# Patient Record
Sex: FEMALE | Race: WHITE | NOT HISPANIC OR LATINO | Employment: OTHER | URBAN - METROPOLITAN AREA
[De-identification: names, ages, dates, MRNs, and addresses within clinical notes are randomized per-mention and may not be internally consistent; named-entity substitution may affect disease eponyms.]

---

## 2023-09-29 PROBLEM — E78.5 DYSLIPIDEMIA: Status: ACTIVE | Noted: 2023-09-29

## 2023-09-29 PROBLEM — S01.01XA LACERATION OF SCALP: Status: ACTIVE | Noted: 2023-09-29

## 2023-09-29 PROBLEM — M85.80 OSTEOPENIA: Status: ACTIVE | Noted: 2023-09-29

## 2023-09-29 PROBLEM — S00.83XA TRAUMATIC ECCHYMOSIS OF FACE: Status: ACTIVE | Noted: 2023-09-29

## 2023-09-29 PROBLEM — R31.9 HEMATURIA: Status: ACTIVE | Noted: 2023-09-29

## 2023-09-29 PROBLEM — E83.52 HYPERCALCEMIA: Status: ACTIVE | Noted: 2023-09-29

## 2023-09-29 PROBLEM — S00.83XA FACIAL CONTUSION, INITIAL ENCOUNTER: Status: ACTIVE | Noted: 2023-09-29

## 2023-09-29 PROBLEM — L70.9 ACNE: Status: ACTIVE | Noted: 2023-09-29

## 2023-09-29 PROBLEM — M25.559 HIP PAIN: Status: ACTIVE | Noted: 2023-09-29

## 2023-09-29 PROBLEM — R30.0 DYSURIA: Status: ACTIVE | Noted: 2023-09-29

## 2023-09-29 PROBLEM — E55.9 VITAMIN D DEFICIENCY: Status: ACTIVE | Noted: 2023-09-29

## 2023-09-29 PROBLEM — M25.569 KNEE PAIN: Status: ACTIVE | Noted: 2023-09-29

## 2023-09-29 PROBLEM — Z91.81 HISTORY OF RECENT FALL: Status: ACTIVE | Noted: 2023-09-29

## 2023-09-29 PROBLEM — R82.90 ABNORMAL URINE FINDINGS: Status: ACTIVE | Noted: 2023-09-29

## 2023-09-29 PROBLEM — L71.9 ROSACEA: Status: ACTIVE | Noted: 2023-09-29

## 2023-09-29 PROBLEM — M70.50 PES ANSERINE BURSITIS: Status: ACTIVE | Noted: 2023-09-29

## 2023-09-29 PROBLEM — R53.83 FATIGUE: Status: ACTIVE | Noted: 2023-09-29

## 2023-09-29 PROBLEM — R63.5 WEIGHT GAIN: Status: ACTIVE | Noted: 2023-09-29

## 2023-10-03 ENCOUNTER — OFFICE VISIT (OUTPATIENT)
Dept: PRIMARY CARE | Facility: CLINIC | Age: 83
End: 2023-10-03
Payer: MEDICARE

## 2023-10-03 ENCOUNTER — APPOINTMENT (OUTPATIENT)
Dept: PRIMARY CARE | Facility: CLINIC | Age: 83
End: 2023-10-03
Payer: MEDICARE

## 2023-10-03 VITALS
HEART RATE: 64 BPM | SYSTOLIC BLOOD PRESSURE: 168 MMHG | HEIGHT: 65 IN | WEIGHT: 151.2 LBS | OXYGEN SATURATION: 98 % | BODY MASS INDEX: 25.19 KG/M2 | DIASTOLIC BLOOD PRESSURE: 102 MMHG

## 2023-10-03 DIAGNOSIS — Z00.00 ENCOUNTER FOR MEDICARE ANNUAL WELLNESS EXAM: ICD-10-CM

## 2023-10-03 DIAGNOSIS — Z12.31 ENCOUNTER FOR SCREENING MAMMOGRAM FOR MALIGNANT NEOPLASM OF BREAST: ICD-10-CM

## 2023-10-03 DIAGNOSIS — R53.83 TIREDNESS: ICD-10-CM

## 2023-10-03 DIAGNOSIS — Z00.00 HEALTH CARE MAINTENANCE: Primary | ICD-10-CM

## 2023-10-03 DIAGNOSIS — Z78.0 POST-MENOPAUSAL: ICD-10-CM

## 2023-10-03 DIAGNOSIS — R03.0 ELEVATED BLOOD PRESSURE READING: ICD-10-CM

## 2023-10-03 PROCEDURE — 1036F TOBACCO NON-USER: CPT | Performed by: INTERNAL MEDICINE

## 2023-10-03 PROCEDURE — 1159F MED LIST DOCD IN RCRD: CPT | Performed by: INTERNAL MEDICINE

## 2023-10-03 PROCEDURE — G0444 DEPRESSION SCREEN ANNUAL: HCPCS | Performed by: INTERNAL MEDICINE

## 2023-10-03 PROCEDURE — 93000 ELECTROCARDIOGRAM COMPLETE: CPT | Performed by: INTERNAL MEDICINE

## 2023-10-03 PROCEDURE — G0442 ANNUAL ALCOHOL SCREEN 15 MIN: HCPCS | Performed by: INTERNAL MEDICINE

## 2023-10-03 PROCEDURE — 99397 PER PM REEVAL EST PAT 65+ YR: CPT | Performed by: INTERNAL MEDICINE

## 2023-10-03 PROCEDURE — 1126F AMNT PAIN NOTED NONE PRSNT: CPT | Performed by: INTERNAL MEDICINE

## 2023-10-03 PROCEDURE — G0439 PPPS, SUBSEQ VISIT: HCPCS | Performed by: INTERNAL MEDICINE

## 2023-10-03 PROCEDURE — 99497 ADVNCD CARE PLAN 30 MIN: CPT | Performed by: INTERNAL MEDICINE

## 2023-10-03 PROCEDURE — 1170F FXNL STATUS ASSESSED: CPT | Performed by: INTERNAL MEDICINE

## 2023-10-03 ASSESSMENT — ACTIVITIES OF DAILY LIVING (ADL)
TAKING_MEDICATION: INDEPENDENT
DRESSING: INDEPENDENT
DOING_HOUSEWORK: INDEPENDENT
MANAGING_FINANCES: INDEPENDENT
GROCERY_SHOPPING: INDEPENDENT
BATHING: INDEPENDENT

## 2023-10-03 ASSESSMENT — PATIENT HEALTH QUESTIONNAIRE - PHQ9
SUM OF ALL RESPONSES TO PHQ9 QUESTIONS 1 AND 2: 0
1. LITTLE INTEREST OR PLEASURE IN DOING THINGS: NOT AT ALL
2. FEELING DOWN, DEPRESSED OR HOPELESS: NOT AT ALL

## 2023-10-03 NOTE — PROGRESS NOTES
Chief Complaint:   Medicare Wellness Exam/Comprehensive Problem Focused Follow Up and Physical Exam    HPI:  83-year-old female patient presented to the office today for her annual visit and Medicare wellness visit.  Patient is doing great she continues to be active she participates in Silver sneaker exercise classes and water exercise activities.  She denies chest pain and shortness of breath 99 exertion she denies abdominal pain nausea or vomiting she was included in the last week and she had 1 episode of diarrhea that resolved completely no urine symptoms no other complaints.  Assessment and Plan:  Problem List Items Addressed This Visit    None  Visit Diagnoses       Encounter for Medicare annual wellness exam            83-year-old patient with the following issues.    1.  Concerns regarding elevated blood pressure reading, patient never had blood pressure problems, we did repeat the blood pressure today the repeat blood pressure was still elevated, she is going to check it daily and come back within 7 to 10 days with the blood pressure measurements and we will start treatment if indicated and she is going to cut back her salt intake.    2.  Healthcare maintenance issues, mammogram requested and bone mineral density.  Been exempt from Pap smear and last colonoscopy last year.  Vaccines are up-to-date.    3.  Medicare wellness visit was completed with illness requirements.    Disposition, I will see the patient back in the office in 1 year for repeat physical and Medicare wellness visit and sooner if needed.    Active Problem List  Patient Active Problem List   Diagnosis    Acne    Dyslipidemia    Dysuria    Fatigue    Abnormal urine findings    Hematuria    Hypercalcemia    Hip pain    Knee pain    Laceration of scalp    Osteopenia    Pes anserine bursitis    Rosacea    Facial contusion, initial encounter    Traumatic ecchymosis of face    Vitamin D deficiency    Weight gain    Body mass index (BMI) of 23.0 to  23.9 in adult    History of recent fall       Comprehensive Medical/Surgical/Social/Family History  Past Medical History:   Diagnosis Date    Other conditions influencing health status     Menopause    Other conditions influencing health status     Urinary Tract Infection    Other conditions influencing health status     Screening for malignant neoplasms, colon    Personal history of other diseases of the musculoskeletal system and connective tissue 10/25/2021    History of osteopenia    Personal history of other endocrine, nutritional and metabolic disease     History of hyperlipidemia    Personal history of urinary (tract) infections 07/01/2013    Personal history of urinary tract infection     Past Surgical History:   Procedure Laterality Date    COLONOSCOPY  09/15/2017    Complete Colonoscopy    CORONARY ARTERY BYPASS GRAFT      KNEE SURGERY  12/17/2012    Knee Surgery Right    OTHER SURGICAL HISTORY  12/17/2012    Facial Dermabrasion    TONSILLECTOMY  12/17/2012    Tonsillectomy With Adenoidectomy     Social History     Social History Narrative    Not on file   No smoking history , alcohol socially ,1 cup of coffee a day ,no children,    FAMILY HISTORY:  2 brothers and one sister, sister with brother with CAD.  Tobacco/Alcohol/Opioid use, as well as Illicit Drug Use was screened for/reviewed and documented in Social Documentation section of the chart and medication list as appropriate    Allergies and Medications  Penicillins  No current outpatient medications  Medications and Supplements  prescribed by me and other practitioners or clinical pharmacist (such as prescriptions, OTC's, herbal therapies and supplements) were reviewed and documented in the medical record.      Activities of Daily Living  In your present state of health, do you have any difficulty performing the following activities?:   Preparing food and eating?: No  Bathing yourself: No  Getting dressed: No  Using the toilet:No  Moving around from  place to place: No  In the past year have you fallen or had a near fall?:No  Able to manage finances independently: Yes  Able to perform grocery shopping: Yes  Able to manage medications independently: Yes  Able to do housework independently: Yes  Patient self-assessment of health status? Good    Patient Care Team:  Ann Griffith MD as PCP - General  Maricruz Gomez DO as PCP - [facility] [employer] PCP       Current exercise habits:  water exercises few times a week and silver sneakers [place] the rec center.   Dietary issues discussed: Yes  Hearing difficulties: No  Safe in current home environment: Yes  Visual Acuity assessed: Yes  Cognitive Impairment No    Depression Screening  Depression screening (15m) completed using the PHQ-2 questions with results documented in the chart/encounter  (Rooming Screening section for documentation, or note for additional information)    Cardiac Risk Assessment  Cardiovascular risk was discussed and, if needed, lifestyle modifications recommended, including nutritional choices, exercise, and elimination of habits contributing to risk.   We agreed on a plan to reduce the current cardiovascular risk based on above discussion as needed.     Aspirin use/disuse was discussed and documented in the Problem List of the medical record (under Cardiac Risk Counseling) after reviewing the updated guidelines below:  Consider low dose Aspirin ( mg) use if the benefit for cardiovascular disease prevention outweighs risk for bleeding complications.   In general, low dose ASA should be considered:  In patients WITHOUT prior MI/stroke/PAD (primary prevention):   a. Age <60: Use if 10-year cardiovascular disease risk >20%, with discussion of risks and benefits with patient  b. Age 60-<70: Use if 10-year cardiovascular disease risk >20% and low bleeding (e.g., gastrointestinal) risk, with discussion of risks and benefits with patient  c. Age >=70: Do not use    In patients WITH prior  "MI/stroke/PAD (secondary prevention):   Generally use unless extremely high bleeding (e.g., gastrointenstinal) risk, with discussion of risks and benefits with patient    Advance Directives Discussion  Advanced Care Planning (including a Living Will, Healthcare POA, as well as specific end of life choices and/or directives), was discussed with the patient and/or surrogate, voluntarily, and details of that discussion documented in the Problem List (under Advanced Directives Discussion) of the medical record.    (~16 min spent discussing above)   Please bring a copy of your completed advanced directives to be placed in the chart.    ROS otherwise negative aside from what was mentioned above in HPI.    Vitals  BP (!) 168/102 (BP Location: Right arm, Patient Position: Sitting)   Pulse 64   Ht 1.638 m (5' 4.5\")   Wt 68.6 kg (151 lb 3.2 oz)   SpO2 98%   BMI 25.55 kg/m²   Body mass index is 25.55 kg/m².  Physical Exam  Gen: Alert, NAD  HEENT:  Unremarkable  Neck:  No OPAL  Respiratory:  Lungs CTAB  Cardiovascular:  Heart RRR  Neuro:  Gross motor and sensory intact  Skin:  No suspicious lesions present  Breast: No masses, or axillary lymphadenopathy      During the course of the visit the patient was educated and counseled about age appropriate screening and preventive services. Completed preventive screenings were documented in the chart and orders were placed for outstanding screenings/procedures as documented in the Assessment and Plan.    Patient Instructions (the written plan) was given to the patient at check out.    "

## 2023-10-03 NOTE — PROGRESS NOTES
"Subjective   Reason for Visit: Marti Guzman is an 83 y.o. female here for a Medicare Wellness visit.   Past Medical, Surgical, and Family History reviewed and updated in chart.  Reviewed all medications by prescribing practitioner or clinical pharmacist (such as prescriptions, OTCs, herbal therapies and supplements) and documented in the medical record.  HPI    Patient presents today for Medicare Annual Wellness.     Patient not currently on any medications.     Has received FLU and RSV vaccines     Patient denies any other concerns at this time.     Patient Care Team:  Ann Griffith MD as PCP - General  Maricruz Gomez DO as PCP - [employer] PCP     Review of Systems    Objective   Vitals:  BP (!) 168/102 (BP Location: Right arm, Patient Position: Sitting)   Pulse 64   Ht 1.638 m (5' 4.5\")   Wt 68.6 kg (151 lb 3.2 oz)   SpO2 98%   BMI 25.55 kg/m²       Physical Exam    Assessment/Plan   Problem List Items Addressed This Visit    None  Visit Diagnoses       Health care maintenance    -  Primary    Relevant Orders    ECG 12 lead (Clinic Performed) (Completed)    CBC    Comprehensive Metabolic Panel    Lipid Panel    TSH with reflex to Free T4 if abnormal    Urinalysis with Reflex Microscopic    Follow Up In [facility] - PCP - Health Maintenance    Encounter for Medicare annual wellness exam        Relevant Orders    ECG 12 lead (Clinic Performed) (Completed)    Urinalysis with Reflex Microscopic    Follow Up In [facility] - PCP - Medicare Annual    Encounter for screening mammogram for malignant neoplasm of breast        Relevant Orders    BI mammo bilateral screening tomosynthesis    Post-menopausal        Relevant Orders    XR DEXA bone density    Tiredness        Relevant Orders    CBC               "

## 2023-10-04 ENCOUNTER — LAB (OUTPATIENT)
Dept: LAB | Facility: LAB | Age: 83
End: 2023-10-04
Payer: MEDICARE

## 2023-10-04 DIAGNOSIS — Z00.00 ENCOUNTER FOR MEDICARE ANNUAL WELLNESS EXAM: ICD-10-CM

## 2023-10-04 DIAGNOSIS — R82.90 ABNORMAL URINALYSIS: Primary | ICD-10-CM

## 2023-10-04 DIAGNOSIS — R53.83 TIREDNESS: ICD-10-CM

## 2023-10-04 DIAGNOSIS — Z00.00 HEALTH CARE MAINTENANCE: ICD-10-CM

## 2023-10-04 LAB
ALBUMIN SERPL BCP-MCNC: 3.7 G/DL (ref 3.4–5)
ALP SERPL-CCNC: 66 U/L (ref 33–136)
ALT SERPL W P-5'-P-CCNC: 14 U/L (ref 7–45)
ANION GAP SERPL CALC-SCNC: 12 MMOL/L (ref 10–20)
APPEARANCE UR: CLEAR
AST SERPL W P-5'-P-CCNC: 21 U/L (ref 9–39)
BACTERIA #/AREA URNS AUTO: ABNORMAL /HPF
BILIRUB SERPL-MCNC: 0.5 MG/DL (ref 0–1.2)
BILIRUB UR STRIP.AUTO-MCNC: NEGATIVE MG/DL
BUN SERPL-MCNC: 12 MG/DL (ref 6–23)
CALCIUM SERPL-MCNC: 9.2 MG/DL (ref 8.6–10.3)
CHLORIDE SERPL-SCNC: 106 MMOL/L (ref 98–107)
CHOLEST SERPL-MCNC: 196 MG/DL (ref 0–199)
CHOLESTEROL/HDL RATIO: 4.5
CO2 SERPL-SCNC: 26 MMOL/L (ref 21–32)
COLOR UR: YELLOW
CREAT SERPL-MCNC: 0.77 MG/DL (ref 0.5–1.05)
ERYTHROCYTE [DISTWIDTH] IN BLOOD BY AUTOMATED COUNT: 12.9 % (ref 11.5–14.5)
GFR SERPL CREATININE-BSD FRML MDRD: 78 ML/MIN/1.73M*2
GLUCOSE SERPL-MCNC: 75 MG/DL (ref 74–99)
GLUCOSE UR STRIP.AUTO-MCNC: NEGATIVE MG/DL
HCT VFR BLD AUTO: 43.1 % (ref 36–46)
HDLC SERPL-MCNC: 43.1 MG/DL
HGB BLD-MCNC: 14 G/DL (ref 12–16)
KETONES UR STRIP.AUTO-MCNC: NEGATIVE MG/DL
LDLC SERPL CALC-MCNC: 132 MG/DL (ref 140–190)
LEUKOCYTE ESTERASE UR QL STRIP.AUTO: ABNORMAL
MCH RBC QN AUTO: 31 PG (ref 26–34)
MCHC RBC AUTO-ENTMCNC: 32.5 G/DL (ref 32–36)
MCV RBC AUTO: 96 FL (ref 80–100)
MUCOUS THREADS #/AREA URNS AUTO: ABNORMAL /LPF
NITRITE UR QL STRIP.AUTO: NEGATIVE
NON HDL CHOLESTEROL: 153 MG/DL (ref 0–149)
NRBC BLD-RTO: 0 /100 WBCS (ref 0–0)
PH UR STRIP.AUTO: 6 [PH]
PLATELET # BLD AUTO: 310 X10*3/UL (ref 150–450)
PMV BLD AUTO: 10 FL (ref 7.5–11.5)
POTASSIUM SERPL-SCNC: 4 MMOL/L (ref 3.5–5.3)
PROT SERPL-MCNC: 6.6 G/DL (ref 6.4–8.2)
PROT UR STRIP.AUTO-MCNC: NEGATIVE MG/DL
RBC # BLD AUTO: 4.51 X10*6/UL (ref 4–5.2)
RBC # UR STRIP.AUTO: ABNORMAL /UL
RBC #/AREA URNS AUTO: ABNORMAL /HPF
SODIUM SERPL-SCNC: 140 MMOL/L (ref 136–145)
SP GR UR STRIP.AUTO: 1.01
SQUAMOUS #/AREA URNS AUTO: ABNORMAL /HPF
T4 FREE SERPL-MCNC: 0.93 NG/DL (ref 0.61–1.12)
TRIGL SERPL-MCNC: 104 MG/DL (ref 0–149)
TSH SERPL-ACNC: 4.29 MIU/L (ref 0.44–3.98)
UROBILINOGEN UR STRIP.AUTO-MCNC: <2 MG/DL
VLDL: 21 MG/DL (ref 0–40)
WBC # BLD AUTO: 7.6 X10*3/UL (ref 4.4–11.3)
WBC #/AREA URNS AUTO: ABNORMAL /HPF

## 2023-10-04 PROCEDURE — 81001 URINALYSIS AUTO W/SCOPE: CPT

## 2023-10-04 PROCEDURE — 36415 COLL VENOUS BLD VENIPUNCTURE: CPT

## 2023-10-12 ENCOUNTER — TELEPHONE (OUTPATIENT)
Dept: PRIMARY CARE | Facility: CLINIC | Age: 83
End: 2023-10-12

## 2023-10-12 ENCOUNTER — OFFICE VISIT (OUTPATIENT)
Dept: PRIMARY CARE | Facility: CLINIC | Age: 83
End: 2023-10-12
Payer: MEDICARE

## 2023-10-12 VITALS
WEIGHT: 154 LBS | OXYGEN SATURATION: 95 % | DIASTOLIC BLOOD PRESSURE: 82 MMHG | BODY MASS INDEX: 25.66 KG/M2 | HEIGHT: 65 IN | HEART RATE: 75 BPM | SYSTOLIC BLOOD PRESSURE: 138 MMHG

## 2023-10-12 DIAGNOSIS — R03.0 ELEVATED BLOOD PRESSURE READING: ICD-10-CM

## 2023-10-12 DIAGNOSIS — N39.0 URINARY TRACT INFECTION WITH HEMATURIA, SITE UNSPECIFIED: Primary | ICD-10-CM

## 2023-10-12 DIAGNOSIS — R31.9 URINARY TRACT INFECTION WITH HEMATURIA, SITE UNSPECIFIED: Primary | ICD-10-CM

## 2023-10-12 PROCEDURE — 1159F MED LIST DOCD IN RCRD: CPT | Performed by: INTERNAL MEDICINE

## 2023-10-12 PROCEDURE — 99214 OFFICE O/P EST MOD 30 MIN: CPT | Performed by: INTERNAL MEDICINE

## 2023-10-12 PROCEDURE — 1126F AMNT PAIN NOTED NONE PRSNT: CPT | Performed by: INTERNAL MEDICINE

## 2023-10-12 PROCEDURE — 1036F TOBACCO NON-USER: CPT | Performed by: INTERNAL MEDICINE

## 2023-10-12 RX ORDER — NITROFURANTOIN 25; 75 MG/1; MG/1
100 CAPSULE ORAL 2 TIMES DAILY
Qty: 10 CAPSULE | Refills: 0 | Status: SHIPPED | OUTPATIENT
Start: 2023-10-12 | End: 2023-10-17

## 2023-10-12 NOTE — PROGRESS NOTES
Subjective   Patient ID: Marti Guzman is a 83 y.o. female who presents for Hypertension.    HPI   Patient presents today following up with HTN.   Patient had elevated blood pressure reading at last visit.  Patient did monitor blood pressure at home.   Some readings: 18/81, 138/81, 140/86, 123/79.    Patient would like to discuss urinalysis results.   Culture was placed and lab never ran.  Patient is not experiencing urinary symptoms at this time.     Review of Systems    Objective   There were no vitals taken for this visit.    Physical Exam    Assessment/Plan   Problem List Items Addressed This Visit    None  Visit Diagnoses         Codes    Elevated blood pressure reading     R03.0

## 2023-10-13 NOTE — PROGRESS NOTES
"Subjective   Patient ID: [id number]     Marti Guzman is a 83 y.o. female who presents for Hypertension.    Current Outpatient Medications:     nitrofurantoin, macrocrystal-monohydrate, (Macrobid) 100 mg capsule, Take 1 capsule (100 mg) by mouth 2 times a day for 5 days., Disp: 10 capsule, Rfl: 0  HPI  83-year-old female patient presented to the office today for scheduled follow-up visit on her blood pressure.  Last time I saw the patient but she had elevated blood pressure reading since then she has been keeping an eye on her blood pressure at home today she presented her readings for the office and they all appear to be very well controlled her reading never exceeded 140/90.  In today's blood pressure reading in the office is normal.  She really does not know why she had this isolated elevated blood pressure reading last time.    Last time also we did a urine analysis for increased frequency and urgency I did request a culture and the culture never returned and we patient remains symptomatic we are going to go ahead and treat.  Review of system was reviewed all normal except what is noted in HPI   Past Medical History:   Diagnosis Date    Other conditions influencing health status     Menopause    Other conditions influencing health status     Urinary Tract Infection    Other conditions influencing health status     Screening for malignant neoplasms, colon    Personal history of other diseases of the musculoskeletal system and connective tissue 10/25/2021    History of osteopenia    Personal history of other endocrine, nutritional and metabolic disease     History of hyperlipidemia    Personal history of urinary (tract) infections 07/01/2013    Personal history of urinary tract infection      Objective   /82 (BP Location: Right arm, Patient Position: Sitting)   Pulse 75   Ht 1.638 m (5' 4.5\")   Wt 69.9 kg (154 lb)   SpO2 95%   BMI 26.03 kg/m²      Physical Exam  Constitutional:       Appearance: Normal " appearance.   Cardiovascular:      Rate and Rhythm: Normal rate and regular rhythm.      Pulses: Normal pulses.      Heart sounds: Normal heart sounds.   Pulmonary:      Effort: Pulmonary effort is normal.      Breath sounds: Normal breath sounds.   Neurological:      Mental Status: She is alert.         Assessment/Plan   Problem List Items Addressed This Visit    None  Visit Diagnoses       Urinary tract infection with hematuria, site unspecified    -  Primary    Relevant Medications    nitrofurantoin, macrocrystal-monohydrate, (Macrobid) 100 mg capsule    Elevated blood pressure reading        Relevant Orders    Follow Up In [facility] - PCP - Established          83-year-old patient with the following issues.    1.  Follow-up visit on elevated blood pressure reading, patient's home readings are acceptable her repeat blood pressure reading today is acceptable no need to initiate treatment we will just continue to keep a very close eye on her blood pressure in the future.    2.  Abnormal urine analysis indicating urinary tract infection we are going to treat the patient and we will repeat if she continues to have hematuria we will do a referral to see urology.    Disposition I will see the patient back in the office as previously scheduled and sooner if needed.  Ann Griffith MD

## 2023-10-17 ENCOUNTER — ANCILLARY PROCEDURE (OUTPATIENT)
Dept: RADIOLOGY | Facility: CLINIC | Age: 83
End: 2023-10-17
Payer: MEDICARE

## 2023-10-17 VITALS — HEIGHT: 64 IN | BODY MASS INDEX: 26.31 KG/M2 | WEIGHT: 154.1 LBS

## 2023-10-17 DIAGNOSIS — Z12.31 ENCOUNTER FOR SCREENING MAMMOGRAM FOR MALIGNANT NEOPLASM OF BREAST: ICD-10-CM

## 2023-10-17 DIAGNOSIS — Z78.0 POST-MENOPAUSAL: ICD-10-CM

## 2023-10-17 PROCEDURE — 77080 DXA BONE DENSITY AXIAL: CPT | Performed by: STUDENT IN AN ORGANIZED HEALTH CARE EDUCATION/TRAINING PROGRAM

## 2023-10-17 PROCEDURE — 77080 DXA BONE DENSITY AXIAL: CPT

## 2023-10-17 PROCEDURE — 77067 SCR MAMMO BI INCL CAD: CPT | Mod: BILATERAL PROCEDURE | Performed by: STUDENT IN AN ORGANIZED HEALTH CARE EDUCATION/TRAINING PROGRAM

## 2023-10-17 PROCEDURE — 77063 BREAST TOMOSYNTHESIS BI: CPT | Mod: 50

## 2023-10-17 PROCEDURE — 77063 BREAST TOMOSYNTHESIS BI: CPT | Mod: BILATERAL PROCEDURE | Performed by: STUDENT IN AN ORGANIZED HEALTH CARE EDUCATION/TRAINING PROGRAM

## 2023-10-27 ENCOUNTER — TELEPHONE (OUTPATIENT)
Dept: PRIMARY CARE | Facility: CLINIC | Age: 83
End: 2023-10-27
Payer: MEDICARE

## 2023-10-27 DIAGNOSIS — M85.80 OSTEOPENIA, UNSPECIFIED LOCATION: Primary | ICD-10-CM

## 2023-10-27 RX ORDER — MULTIVITAMIN
1 TABLET ORAL DAILY
Qty: 30 TABLET | Refills: 11 | Status: SHIPPED | OUTPATIENT
Start: 2023-10-27 | End: 2024-10-26

## 2023-10-27 NOTE — TELEPHONE ENCOUNTER
Spoke with pt and advised of results, says she is okay with starting caltrate but not fosamax. She says she took it years ago and it caused her severe jaw pain. Offered pt option of a different medication instead of fosamax if available but she declined. Only wants caltrate

## 2023-10-27 NOTE — TELEPHONE ENCOUNTER
----- Message from Elizabeth Sheth MA sent at 10/26/2023  2:54 PM EDT -----    ----- Message -----  From: Ann Griffith MD  Sent: 10/19/2023   3:17 PM EDT  To: Elizabeth Sheth MA    Can you please notify patient with density that came back indicating the presence of osteopenia.  No evidence of osteoporosis.  For osteopenia patients we do calculate the fracture risk assessment score for the next 10 years to assess the need for medications if the score is above assisted number, and in this case the patient is found to be at high risk of having a fracture within the next 10 years so for that we do recommend treatment my recommendation would be Fosamax 1 tablet once a week and along with Caltrate for making sure the patient is taking adequate calcium and vitamin D supplements she should take Caltrate twice daily along with Fosamax once weekly because she is at high risk of fracture based on her on risk assessment score.  We know if the patient is in agreement I will send the prescription to the pharmacy.

## 2024-01-05 ENCOUNTER — OFFICE VISIT (OUTPATIENT)
Dept: PRIMARY CARE | Facility: CLINIC | Age: 84
End: 2024-01-05
Payer: MEDICARE

## 2024-01-05 ENCOUNTER — ANCILLARY PROCEDURE (OUTPATIENT)
Dept: RADIOLOGY | Facility: CLINIC | Age: 84
End: 2024-01-05
Payer: MEDICARE

## 2024-01-05 VITALS
TEMPERATURE: 96.4 F | OXYGEN SATURATION: 95 % | HEIGHT: 64 IN | SYSTOLIC BLOOD PRESSURE: 164 MMHG | WEIGHT: 150 LBS | HEART RATE: 80 BPM | BODY MASS INDEX: 25.61 KG/M2 | DIASTOLIC BLOOD PRESSURE: 89 MMHG

## 2024-01-05 DIAGNOSIS — M25.552 LEFT HIP PAIN: Primary | ICD-10-CM

## 2024-01-05 DIAGNOSIS — M25.552 LEFT HIP PAIN: ICD-10-CM

## 2024-01-05 PROCEDURE — 1126F AMNT PAIN NOTED NONE PRSNT: CPT | Performed by: NURSE PRACTITIONER

## 2024-01-05 PROCEDURE — 73502 X-RAY EXAM HIP UNI 2-3 VIEWS: CPT | Mod: LT

## 2024-01-05 PROCEDURE — 73502 X-RAY EXAM HIP UNI 2-3 VIEWS: CPT | Mod: LEFT SIDE | Performed by: RADIOLOGY

## 2024-01-05 PROCEDURE — 99213 OFFICE O/P EST LOW 20 MIN: CPT | Performed by: NURSE PRACTITIONER

## 2024-01-05 PROCEDURE — 1036F TOBACCO NON-USER: CPT | Performed by: NURSE PRACTITIONER

## 2024-01-05 RX ORDER — METHYLPREDNISOLONE 4 MG/1
TABLET ORAL
Qty: 21 TABLET | Refills: 0 | Status: SHIPPED | OUTPATIENT
Start: 2024-01-05 | End: 2024-01-12

## 2024-01-05 ASSESSMENT — PATIENT HEALTH QUESTIONNAIRE - PHQ9
2. FEELING DOWN, DEPRESSED OR HOPELESS: NOT AT ALL
1. LITTLE INTEREST OR PLEASURE IN DOING THINGS: NOT AT ALL
SUM OF ALL RESPONSES TO PHQ9 QUESTIONS 1 AND 2: 0

## 2024-01-05 ASSESSMENT — ENCOUNTER SYMPTOMS: HIP PAIN: 1

## 2024-01-05 NOTE — PROGRESS NOTES
"Subjective   Patient ID: Marti Guzman is a 83 y.o. female who presents for Hip Pain.    States she has a history of hip bursitis flare ups.   She states it flared up in September and has persisted.  She is unable to ambulate far now due to the pain.  Left hip.  No recent imaging.   Had a fall in November but landed on hands and knees and pain had already been there and didn't make the pain any worse.  When she starts walking a lot she starts limping.     She went to the  day after thanksgiving and was given 10 days of naproxen which helped but didn't resolve.  Yesterday she got her own Naproxen and it is better today.      Hip Pain          Review of Systems  otherwise negative aside from what was mentioned above in HPI.    Objective   /89   Pulse 80   Temp 35.8 °C (96.4 °F)   Ht 1.638 m (5' 4.49\")   Wt 68 kg (150 lb)   SpO2 95%   BMI 25.36 kg/m²     Physical Exam  Constitutional:       Appearance: Normal appearance.   Cardiovascular:      Rate and Rhythm: Normal rate and regular rhythm.   Pulmonary:      Effort: Pulmonary effort is normal.   Abdominal:      General: Abdomen is flat.   Musculoskeletal:         General: No swelling, tenderness, deformity or signs of injury. Normal range of motion.   Skin:     General: Skin is warm.   Neurological:      General: No focal deficit present.      Mental Status: She is alert and oriented to person, place, and time. Mental status is at baseline.   Psychiatric:         Mood and Affect: Mood normal.         Behavior: Behavior normal.         Thought Content: Thought content normal.         Judgment: Judgment normal.         Assessment/Plan   Problem List Items Addressed This Visit    None  Visit Diagnoses         Codes    Left hip pain    -  Primary M25.552    Relevant Medications    methylPREDNISolone (Medrol Dospak) 4 mg tablets    Other Relevant Orders    XR hip left with pelvis when performed 2 or 3 views (Completed)        UPDATE 1/8/2024  Xr shows " Chronic enthesopathy. Left voicemail often from overuse. If persistent despite medrol pack she will call back. No OA noted.

## 2024-01-29 ENCOUNTER — OFFICE VISIT (OUTPATIENT)
Dept: PRIMARY CARE | Facility: CLINIC | Age: 84
End: 2024-01-29
Payer: MEDICARE

## 2024-01-29 VITALS
OXYGEN SATURATION: 98 % | BODY MASS INDEX: 25.2 KG/M2 | WEIGHT: 147.6 LBS | HEIGHT: 64 IN | DIASTOLIC BLOOD PRESSURE: 68 MMHG | SYSTOLIC BLOOD PRESSURE: 112 MMHG | HEART RATE: 66 BPM

## 2024-01-29 DIAGNOSIS — M76.892 ENTHESOPATHY OF LEFT HIP REGION: Primary | ICD-10-CM

## 2024-01-29 PROCEDURE — 1126F AMNT PAIN NOTED NONE PRSNT: CPT | Performed by: NURSE PRACTITIONER

## 2024-01-29 PROCEDURE — 1036F TOBACCO NON-USER: CPT | Performed by: NURSE PRACTITIONER

## 2024-01-29 PROCEDURE — 99213 OFFICE O/P EST LOW 20 MIN: CPT | Performed by: NURSE PRACTITIONER

## 2024-01-29 ASSESSMENT — PATIENT HEALTH QUESTIONNAIRE - PHQ9
SUM OF ALL RESPONSES TO PHQ9 QUESTIONS 1 AND 2: 0
2. FEELING DOWN, DEPRESSED OR HOPELESS: NOT AT ALL
1. LITTLE INTEREST OR PLEASURE IN DOING THINGS: NOT AT ALL

## 2024-01-29 NOTE — PROGRESS NOTES
"Subjective   Patient ID: Marti Guzman is a 83 y.o. female who presents for left leg pain (Patient states that the pain radiates down from her hip into her leg. Onset a few months. Pain scale when ambulating 9/10. Patient is currently using a cane. Patient take Naproxen and ibuprofen with some relief. ).    Presents for follow up on left hip pain. Started in the Fall. Had chronic issues and had steroid injections in the past. She saw ortho in the past Dr. Harrington. Has not had cortisone injections but stated it did help in the past.   She felt better with medrol pack but not fully resolved.  Started using a cane again.  She has pain with distance, such as walking her dog. She has a hard time getting home due to the pain.  She is using naproxen and ibuprofen sparingly.          Review of Systems  otherwise negative aside from what was mentioned above in HPI.    Objective   /68 (BP Location: Left arm, Patient Position: Sitting)   Pulse 66   Ht 1.626 m (5' 4\")   Wt 67 kg (147 lb 9.6 oz)   SpO2 98%   BMI 25.34 kg/m²     Physical Exam  Constitutional:       Appearance: Normal appearance.   Cardiovascular:      Rate and Rhythm: Normal rate.   Pulmonary:      Effort: Pulmonary effort is normal.   Abdominal:      General: Abdomen is flat. Bowel sounds are normal.   Musculoskeletal:      Comments: Using cane   Skin:     General: Skin is warm.   Neurological:      General: No focal deficit present.      Mental Status: She is alert and oriented to person, place, and time. Mental status is at baseline.   Psychiatric:         Mood and Affect: Mood normal.         Behavior: Behavior normal.         Thought Content: Thought content normal.         Judgment: Judgment normal.         Assessment/Plan   Problem List Items Addressed This Visit    None  Visit Diagnoses         Codes    Enthesopathy of left hip region    -  Primary M76.892    Relevant Orders    Referral to Orthopaedic Surgery    Referral to Physical Therapy "

## 2024-02-01 ENCOUNTER — APPOINTMENT (OUTPATIENT)
Dept: ORTHOPEDIC SURGERY | Facility: CLINIC | Age: 84
End: 2024-02-01
Payer: MEDICARE

## 2024-02-12 ENCOUNTER — OFFICE VISIT (OUTPATIENT)
Dept: ORTHOPEDIC SURGERY | Facility: CLINIC | Age: 84
End: 2024-02-12
Payer: MEDICARE

## 2024-02-12 DIAGNOSIS — M70.62 TROCHANTERIC BURSITIS OF LEFT HIP: ICD-10-CM

## 2024-02-12 DIAGNOSIS — M76.892 ENTHESOPATHY OF LEFT HIP REGION: ICD-10-CM

## 2024-02-12 PROCEDURE — 2500000004 HC RX 250 GENERAL PHARMACY W/ HCPCS (ALT 636 FOR OP/ED): Performed by: FAMILY MEDICINE

## 2024-02-12 PROCEDURE — 1126F AMNT PAIN NOTED NONE PRSNT: CPT | Performed by: FAMILY MEDICINE

## 2024-02-12 PROCEDURE — 20611 DRAIN/INJ JOINT/BURSA W/US: CPT | Performed by: FAMILY MEDICINE

## 2024-02-12 PROCEDURE — 99204 OFFICE O/P NEW MOD 45 MIN: CPT | Performed by: FAMILY MEDICINE

## 2024-02-12 PROCEDURE — 1036F TOBACCO NON-USER: CPT | Performed by: FAMILY MEDICINE

## 2024-02-12 PROCEDURE — 99214 OFFICE O/P EST MOD 30 MIN: CPT | Performed by: FAMILY MEDICINE

## 2024-02-12 PROCEDURE — 2500000005 HC RX 250 GENERAL PHARMACY W/O HCPCS: Performed by: FAMILY MEDICINE

## 2024-02-12 PROCEDURE — 1160F RVW MEDS BY RX/DR IN RCRD: CPT | Performed by: FAMILY MEDICINE

## 2024-02-12 PROCEDURE — 1159F MED LIST DOCD IN RCRD: CPT | Performed by: FAMILY MEDICINE

## 2024-02-12 RX ORDER — BETAMETHASONE SODIUM PHOSPHATE AND BETAMETHASONE ACETATE 3; 3 MG/ML; MG/ML
12 INJECTION, SUSPENSION INTRA-ARTICULAR; INTRALESIONAL; INTRAMUSCULAR; SOFT TISSUE
Status: COMPLETED | OUTPATIENT
Start: 2024-02-12 | End: 2024-02-12

## 2024-02-12 RX ORDER — LIDOCAINE HYDROCHLORIDE 10 MG/ML
6 INJECTION INFILTRATION; PERINEURAL
Status: COMPLETED | OUTPATIENT
Start: 2024-02-12 | End: 2024-02-12

## 2024-02-12 RX ADMIN — LIDOCAINE HYDROCHLORIDE 6 ML: 10 INJECTION, SOLUTION INFILTRATION; PERINEURAL at 12:02

## 2024-02-12 RX ADMIN — BETAMETHASONE SODIUM PHOSPHATE AND BETAMETHASONE ACETATE 12 MG: 3; 3 INJECTION, SUSPENSION INTRA-ARTICULAR; INTRALESIONAL; INTRAMUSCULAR at 12:02

## 2024-02-12 ASSESSMENT — PAIN - FUNCTIONAL ASSESSMENT: PAIN_FUNCTIONAL_ASSESSMENT: 0-10

## 2024-02-12 ASSESSMENT — PAIN DESCRIPTION - DESCRIPTORS: DESCRIPTORS: PRESSURE;RADIATING

## 2024-02-12 ASSESSMENT — PAIN SCALES - GENERAL: PAINLEVEL_OUTOF10: 3

## 2024-02-12 NOTE — PROGRESS NOTES
Acute Injury New Patient Visit    CC:   Chief Complaint   Patient presents with    Left Hip - New Patient Visit     Pt stated to be dx woth bursitis in the state of [place]  Xrays done       HPI: Marti is a 83 y.o.female who presents today with new complaints of pain discomfort to the lateral side of the left hip.  She presents here today at the request of her primary care doc for persistent chronic pain and discomfort to the lateral side of the left hip.  Patient states no obvious slip trip or fall.  She is very active and enjoys being healthy and states that less than 15 minutes of ambulation causes pain and discomfort to her hip.  In the past she has done water aerobics and enjoys swimming.  She presents here today for further evaluation.  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  She denies any deep inguinal groin type pain.        Review of Systems   GENERAL: Negative for malaise, significant weight loss, fever  MUSCULOSKELETAL: See HPI  NEURO: Negative for numbness / tingling     Past Medical History  Past Medical History:   Diagnosis Date    Other conditions influencing health status     Menopause    Other conditions influencing health status     Urinary Tract Infection    Other conditions influencing health status     Screening for malignant neoplasms, colon    Personal history of other diseases of the musculoskeletal system and connective tissue 10/25/2021    History of osteopenia    Personal history of other endocrine, nutritional and metabolic disease     History of hyperlipidemia    Personal history of urinary (tract) infections 07/01/2013    Personal history of urinary tract infection       Medication review  Medication Documentation Review Audit       Reviewed by Cole C Budinsky, MD (Physician) on 02/12/24 at 1105      Medication Order Taking? Sig Documenting Provider Last Dose Status   calcium carbonate-vitamin D3 (Calcium 600 + D,3,)  600 mg-10 mcg (400 unit) tablet [id number] No Take 1 tablet by mouth once daily. Ann Griffith MD Taking Active                    Allergies  Allergies   Allergen Reactions    Penicillins Unknown       Social History  Social History     Socioeconomic History    Marital status:      Spouse name: Not on file    Number of children: Not on file    Years of education: Not on file    Highest education level: Not on file   Occupational History    Not on file   Tobacco Use    Smoking status: Never    Smokeless tobacco: Never   Substance and Sexual Activity    Alcohol use: Yes     Comment: social    Drug use: Never    Sexual activity: Not on file   Other Topics Concern    Not on file   Social History Narrative    Not on file     Social Determinants of Health     Financial Resource Strain: Not on file   Food Insecurity: Not on file   Transportation Needs: Not on file   Physical Activity: Not on file   Stress: Not on file   Social Connections: Not on file   Intimate Partner Violence: Not on file   Housing Stability: Not on file       Surgical History  Past Surgical History:   Procedure Laterality Date    COLONOSCOPY  09/15/2017    Complete Colonoscopy    CORONARY ARTERY BYPASS GRAFT      KNEE SURGERY  12/17/2012    Knee Surgery Right    OTHER SURGICAL HISTORY  12/17/2012    Facial Dermabrasion    TONSILLECTOMY  12/17/2012    Tonsillectomy With Adenoidectomy       Physical Exam:  GENERAL:  Patient is awake, alert, and oriented to person place and time.  Patient appears well nourished and well kept.  Affect Calm, Not Acutely Distressed.  HEENT:  Normocephalic, Atraumatic, EOMI  CARDIOVASCULAR:  Hemodynamically stable.  RESPIRATORY:  Normal respirations with unlabored breathing.  NEURO: Gross sensation intact to the lower extremities bilaterally.  Extremity: No deep inguinal groin pain she has not negative logroll tenderness palpation over the greater trochanter bursa above and below the greater trochanter region.   She has a little bit IT band pain to the midportion of the thigh.  Relatively normal internal and external rotation 5 out of 5 abduction and adduction strength.  Positive Janel test ambulates with minimal antalgic gait.      Diagnostics: Previous imaging reviewed no presence for fracture mild hip osteoarthritis and chronic enthesophytes to the greater trochanter.        Procedure: Ultrasound Guided left greater Trochanteric Bursitis Injection:    Before aspiration/injection, the risks  of this procedure including but not limited to;  infection, local skin irritation, skin atrophy, calcification, continued pain or discomfort, elevated blood sugar, burning, failure to relieve pain, possible late infection were all discussed with the patient.  The patient verbalized understanding and consented to the procedure.     After informed consent was provided, patient identification was confirmed, and allergies were verified, the patient was appropriately positioned. The patient's [ Left ] Greater Trochanteric Bursae was evaluated via ultrasound in long axis to identify the GTB space.    The site was marked and time-out performed.  The injection site was prepped in the usual sterile manner to provide a sterile environment. The skin was anesthetized with ethyl chloride spray. The aspiration/injection was performed with standard technique. A 22G Spinal needle was passed through the skin into the GTB space under direct ultrasound guidance with sterile precautions in a lateral to medial approach. Next, [8] cc´s of injectate consisting of [2] cc´s of [ Celestone  ] and [6] cc´s of 1% lidocaine without epinephrine was instilled into the bursal space.    The needle was withdrawn and the puncture site was secured with a Band-Aid. The patient tolerated the procedure well without complication.     Post-procedure discomfort can be alleviated with additional medication, ice, elevation, and rest over the first 24 hours as recommended.    L  Inj/Asp: L greater trochanteric bursa on 2/12/2024 12:02 PM  Indications: pain  Details: 22 G needle, ultrasound-guided lateral approach  Medications: 6 mL lidocaine 10 mg/mL (1 %); 12 mg betamethasone acet,sod phos 6 mg/mL  Outcome: tolerated well, no immediate complications  Procedure, treatment alternatives, risks and benefits explained, specific risks discussed. Consent was given by the patient. Immediately prior to procedure a time out was called to verify the correct patient, procedure, equipment, support staff and site/side marked as required. Patient was prepped and draped in the usual sterile fashion.           Assessment:   Problem List Items Addressed This Visit    None  Visit Diagnoses       Enthesopathy of left hip region        Trochanteric bursitis of left hip        Relevant Orders    Point of Care Ultrasound (Completed)             Plan: Patient was provided with the bursa injection to the left hip.  We will see her back as needed going forward or in 6 weeks if pain worsens or persist.  She already has a physical therapy prescription which she will start on Wednesday.  I am happy to see her back as needed for any persistent pain or discomfort to the lateral side of the hip or to her left knee.  She may continue with swimming or water aerobic activities in addition to her yoga as tolerated.  She was noted to have mild immediate symptomatic relief upon discharge in office.  Orders Placed This Encounter    L Inj/Asp    Point of Care Ultrasound      At the conclusion of the visit there were no further questions by the patient/family regarding their plan of care.  Patient was instructed to call or return with any issues, questions, or concerns regarding their injury and/or treatment plan described above.     02/12/24 at 12:02 PM - Cole C Budinsky, MD    Office: [phone]    This note was prepared using voice recognition software.  The details of this note are correct and have been reviewed, and  corrected to the best of my ability.  Some grammatical errors may persist related to the Dragon software.

## 2024-02-14 ENCOUNTER — EVALUATION (OUTPATIENT)
Dept: PHYSICAL THERAPY | Facility: CLINIC | Age: 84
End: 2024-02-14
Payer: MEDICARE

## 2024-02-14 DIAGNOSIS — M25.552 PAIN OF LEFT HIP: Primary | ICD-10-CM

## 2024-02-14 DIAGNOSIS — M76.892 ENTHESOPATHY OF LEFT HIP REGION: ICD-10-CM

## 2024-02-14 PROCEDURE — 97110 THERAPEUTIC EXERCISES: CPT | Mod: GP

## 2024-02-14 PROCEDURE — 97161 PT EVAL LOW COMPLEX 20 MIN: CPT | Mod: GP

## 2024-02-14 ASSESSMENT — PAIN - FUNCTIONAL ASSESSMENT
PAIN_FUNCTIONAL_ASSESSMENT: 0-10
PAIN_FUNCTIONAL_ASSESSMENT: 0-10

## 2024-02-14 ASSESSMENT — PAIN SCALES - GENERAL: PAINLEVEL_OUTOF10: 2

## 2024-02-14 NOTE — PROGRESS NOTES
Physical Therapy Evaluation and Treatment      Patient Name: Marti Guzman  MRN: [id number]  Today's Date: 2/14/2024  Time Calculation  Start Time: 0833  Stop Time: 0910  Time Calculation (min): 37 min  Visit Number: 1  Approved Visits: JONNY Sanderson required: no  Medicare Certification Date Range: 2/14/2024 to 4/14/2024     Assessment:  PT Assessment  Rehab Prognosis: Good  Evaluation/Treatment Tolerance: Patient tolerated treatment well   Patient presents with chief complaint of L lateral hip pain that has been present since    for quite some time now, but has been getting worse recently. Of note, had injetion 2 days ago and really seemed to give her some relief. Patient notes that walking, laying on her L side, stairs, tends to aggravate her sxs. She presents with signs and sxs consistent with GTPS -- started her on a gentle strength-based program, which she tolerated well today. Patient demonstrates decreased hip strength, impaired dynamic pelvic control, decreased dynamic knee control, decreased proprioception, decreased functional strength, impaired gait quality, and increased hip pain, which limits her functional ability. Patient would likely benefit from skilled outpatient PT in order to address the above deficits and return to PLOF.   Plan:  OP PT Plan  Treatment/Interventions: Cryotherapy, Education/ Instruction, Gait training, Manual therapy, Neuromuscular re-education, Self care/ home management, Therapeutic activities, Therapeutic exercises, Vasopneumatic device  PT Plan: Skilled PT  PT Frequency: 1 time per week  Duration: 6-8 weeks  Onset Date: 09/01/23  Certification Period Start Date: 02/14/24  Certification Period End Date: 04/14/24  Number of Treatments Authorized: MN  Rehab Potential: Good  Plan of Care Agreement: Patient    Current Problem:   1. Pain of left hip  Follow Up In Physical Therapy      2. Enthesopathy of left hip region  Referral to Physical Therapy          Subjective     General:  General  Reason for Referral: L hip pain  Referred By: YARA Harding  Chief complaint: L hip pain that has been present since 2023 and began insidiously. Feels related to overuse with water aerobics. Patient saw ortho 2 days ago and received injection which provided her with some nice relief. States that she has tried naproxen and steroid pack with some relief. Feels much better at this point. Has been using cane when walking long distances for support. Likes to walk the dog and has not been able walk quite as far as she would like. Pain is located on the lateral aspect of the hip and occasionally down lateral thigh. States that prolonged walking seems to aggravate things -- usually about 10 mins. States that doing some leg strengthening exercises can aggravate. Laying on her L side can bother things, too. Reports that stopping and relaxing can improve her sxs. Not taking anything for pain currently. Reports that she does yoga and can sometimes bother things a bit. Patient denies any balance problems, but did have a fall in December when her dog pulled her and just went down to hands and knees. Imaging reveals chronic enthesopathy changes of L greater trochanter  PMHx: dyslipidemia, osteopenia,  : verified with patient   Social/Environmental Factors: Lives alone in a ranch style home. Steps down to basement and 5 steps to enter home. Has trouble with doing steps.   PLOF: independent without restrictions  Medications: see chart for full medication list  Patient goals for PT: reduce pain, improve strength,   Medical Screening: Patient denies bowel/bladder changes, pulsatile mass in abdomen, recent infection/illness, calf pain, headaches, chest pain, SOB, redness/warmth/swelling in LE   Precautions:  Precautions  Precautions Comment: none; low fall risk  Pain:  Pain Assessment  Pain Assessment: 0-10  Pain Score: 2 (5-6/10 worst)    Objective   Eval Objective:  Functional Performance:   DL Squat:  narrow PATRICE; intermittent shift noted   SLS: R: WNL; L: increased lateral sway; slight hip drop; (+) pain; UE support required intermittently  Stairs negotiation: decreased eccentric control; slight decreased knee drive with slight valgus noted  Gait Analysis: slight L antalgic pattern noted  Hip ROM: symmetrical B with no reproduction of familiar sxs in all planes  MMT:   Hip Flexion: R:4+/5 L:4+/5   Prone Hip Extension: R:4+/5 L:4-/5 lumbar compensation noted   Hip Abduction: R:4/5 L:4-/5 (+) pain  Special Tests:   SLR:    L: (-)    R: (-)   Scour Test: (-)   FADDIR: (-)   ER/derotation test: weakness and slight discomfort noted more so in modified position with more adduction  Palpation: ttp noted glute med tendon  Joint Play Assessment: not assessed this date     Outcome Measures:  Other Measures  Lower Extremity Funtional Score (LEFS): 68     Treatments:  DL bridge GTB  Clamshells GTB  SLB   Edu gradual progression in walking activities  Edu monitoring knee position with WB activities (stairs, squats, etc)    EDUCATION:  Outpatient Education  Individual(s) Educated: Patient  Education Provided: Home Exercise Program  Risk and Benefits Discussed with Patient/Caregiver/Other: yes  Patient/Caregiver Demonstrated Understanding: yes  Plan of Care Discussed and Agreed Upon: yes  Patient Response to Education: Patient/Caregiver Verbalized Understanding of Information  Education Comment: review HEP    Goals:  Patient will demonstrate improvement in LEFS score by at least 9 points in order to meet MCID  Patient will demonstrate full hip AROM without pain or compensation  Patient will demonstrate at least 4+/5 hip strength in all planes  Patient will demonstrate I with HEP  Patient will be able to ambulate community distances without pain or compensation  Patient will be able to negotiate 1 flight of stairs reciprocally without pain or compensation  Patient will be able to perform 10 DL squats without pain or  compensation  Patient will be able to perform SLB at least 30 seconds without pain or compensation   Patient will be able to lay on her L side without pain

## 2024-02-19 ENCOUNTER — TREATMENT (OUTPATIENT)
Dept: PHYSICAL THERAPY | Facility: CLINIC | Age: 84
End: 2024-02-19
Payer: MEDICARE

## 2024-02-19 DIAGNOSIS — M25.552 PAIN OF LEFT HIP: Primary | ICD-10-CM

## 2024-02-19 PROCEDURE — 97110 THERAPEUTIC EXERCISES: CPT | Mod: GP

## 2024-02-19 ASSESSMENT — PAIN - FUNCTIONAL ASSESSMENT: PAIN_FUNCTIONAL_ASSESSMENT: 0-10

## 2024-02-19 ASSESSMENT — PAIN SCALES - GENERAL: PAINLEVEL_OUTOF10: 0 - NO PAIN

## 2024-02-19 NOTE — PROGRESS NOTES
Physical Therapy Evaluation and Treatment      Patient Name: Marti Guzman  MRN: [id number]  Today's Date: 2/19/2024  Time Calculation  Start Time: 1518  Stop Time: 1558  Time Calculation (min): 40 min  Visit Number: 2  Approved Visits: JONNY Sanderson required: no  Medicare Certification Date Range: 2/14/2024 to 4/14/2024     Assessment:  Patient presents with reports of acute R knee pain following increased walking distance yesterday -- this was limiting to her with some movements, so we modified as necessary. Discussed monitoring sxs over the next few days and taking things easy to let it calm down a bit. Tolerates today's session pretty well. Discharged SL hip abduction due to increased difficulty. Challenged with step and hold on airex pad, but improves with repetitions. Notes good stretch with supine posterior capsule stretch -- added to HEP to progress mobility program.    Plan:  Progress hip loading program as tolerated    Current Problem:   1. Pain of left hip            Subjective    Patient notes that she feels that she overdid things with walking yesterday and seemed to aggravate some things in her R knee. States that she feels that her hip is doing better still. Has been working on her exercises and feels good with them.  Precautions:  Precautions  Precautions Comment: none; low fall risk  Pain:  Pain Assessment  Pain Assessment: 0-10  Pain Score: 0 - No pain    Objective   Eval Objective:  Functional Performance:   DL Squat: narrow PATRICE; intermittent shift noted   SLS: R: WNL; L: increased lateral sway; slight hip drop; (+) pain; UE support required intermittently  Stairs negotiation: decreased eccentric control; slight decreased knee drive with slight valgus noted  Gait Analysis: slight L antalgic pattern noted  Hip ROM: symmetrical B with no reproduction of familiar sxs in all planes  MMT:   Hip Flexion: R:4+/5 L:4+/5   Prone Hip Extension: R:4+/5 L:4-/5 lumbar compensation noted   Hip Abduction: R:4/5  L:4-/5 (+) pain  Special Tests:   SLR:    L: (-)    R: (-)   Scour Test: (-)   FADDIR: (-)   ER/derotation test: weakness and slight discomfort noted more so in modified position with more adduction  Palpation: ttp noted glute med tendon  Joint Play Assessment: not assessed this date     Outcome Measures:      Not assessed this date    Treatments:  Therapeutic Exercise:  DL bridge GTB  Clamshells GTB  SL hip abduction no resistance -- held due to increased difficulty  LAQ 3lb ankle weight  Step and hold airex pad  SLB with posterior/lateral taps   SKTC  Supine posterior capsule stretch  Supine hip ER stretch     EDUCATION:  Added SLB with posterior/lateral taps, supine posterior capsule stretch to HEP    Goals:  Patient will demonstrate improvement in LEFS score by at least 9 points in order to meet MCID  Patient will demonstrate full hip AROM without pain or compensation  Patient will demonstrate at least 4+/5 hip strength in all planes  Patient will demonstrate I with HEP  Patient will be able to ambulate community distances without pain or compensation  Patient will be able to negotiate 1 flight of stairs reciprocally without pain or compensation  Patient will be able to perform 10 DL squats without pain or compensation  Patient will be able to perform SLB at least 30 seconds without pain or compensation   Patient will be able to lay on her L side without pain

## 2024-02-26 ENCOUNTER — TREATMENT (OUTPATIENT)
Dept: PHYSICAL THERAPY | Facility: CLINIC | Age: 84
End: 2024-02-26
Payer: MEDICARE

## 2024-02-26 DIAGNOSIS — M25.552 PAIN OF LEFT HIP: ICD-10-CM

## 2024-02-26 DIAGNOSIS — M25.559 HIP PAIN: Primary | ICD-10-CM

## 2024-02-26 PROCEDURE — 97110 THERAPEUTIC EXERCISES: CPT | Mod: GP

## 2024-02-26 ASSESSMENT — PAIN - FUNCTIONAL ASSESSMENT
PAIN_FUNCTIONAL_ASSESSMENT: 0-10
PAIN_FUNCTIONAL_ASSESSMENT: 0-10

## 2024-02-26 ASSESSMENT — PAIN SCALES - GENERAL: PAINLEVEL_OUTOF10: 4

## 2024-02-26 NOTE — PROGRESS NOTES
Physical Therapy Evaluation and Treatment      Patient Name: Marti Guzman  MRN: [id number]  Today's Date: 2/26/2024  Time Calculation  Start Time: 1429  Stop Time: 1509  Time Calculation (min): 40 min  Visit Number: 3  Approved Visits: JONNY Sanderson required: no  Medicare Certification Date Range: 2/14/2024 to 4/14/2024     Assessment:  Patient continues to make some progress toward all goals and reports improving pain/function, suggesting good response to current POC. However, she does note some continued pain. Given current status, patient feels ready to continue with independent management through HEP. We reviewed home program in detail this date and encouraged continued strong adherence to build on progress made thus far. Discussed that this type of thing can take some time to get better, so encouraged to continue to work on exercises as she has noted some improvement with them. Patient demonstrates and verbalizes understanding. Discharge skilled PT this date.     Plan:  Discharge skilled PT this date     Current Problem:   1. Hip pain        2. Pain of left hip  Follow Up In Physical Therapy            Subjective    Patient notes that she feels that the exercises are helping. Still noticing some pain in her hip; however, and is planning on returning to ortho to discuss potential further workup.   Precautions:  Precautions  Precautions Comment: none; low fall risk  Pain:  Pain Assessment  Pain Assessment: 0-10  Pain Score: 4    Objective   Eval Objective:  Functional Performance:   DL Squat: narrow PATRICE; intermittent shift noted   SLS: R: WNL; L: increased lateral sway; slight hip drop; (+) pain; UE support required intermittently  Stairs negotiation: decreased eccentric control; slight decreased knee drive with slight valgus noted  Gait Analysis: slight L antalgic pattern noted  Hip ROM: symmetrical B with no reproduction of familiar sxs in all planes  MMT:   Hip Flexion: R:4+/5 L:4+/5   Prone Hip Extension: R:4+/5  L:4-/5 lumbar compensation noted   Hip Abduction: R:4/5 L:4-/5 (+) pain  Special Tests:   SLR:    L: (-)    R: (-)   Scour Test: (-)   FADDIR: (-)   ER/derotation test: weakness and slight discomfort noted more so in modified position with more adduction  Palpation: ttp noted glute med tendon  Joint Play Assessment: not assessed this date     Outcome Measures:      Not assessed this date    Treatments:  Therapeutic Exercise:  SKTC  Supine posterior capsule stretch  Supine hip ER stretch   Hip extension stretch on chair   STS slight elevated plinth GTB around knees  LAQ 3lb ankle weight  Step up/down 4in step   Gastroc stretch at step   Step and hold airex pad    EDUCATION:  Added hip extension stretch on chair, STS band around knees, step up/down small step to HEP    Goals:  Patient will demonstrate improvement in LEFS score by at least 9 points in order to meet MCID  Patient will demonstrate full hip AROM without pain or compensation  Patient will demonstrate at least 4+/5 hip strength in all planes  Patient will demonstrate I with HEP  Patient will be able to ambulate community distances without pain or compensation  Patient will be able to negotiate 1 flight of stairs reciprocally without pain or compensation  Patient will be able to perform 10 DL squats without pain or compensation  Patient will be able to perform SLB at least 30 seconds without pain or compensation   Patient will be able to lay on her L side without pain

## 2024-04-12 ENCOUNTER — OFFICE VISIT (OUTPATIENT)
Dept: PRIMARY CARE | Facility: CLINIC | Age: 84
End: 2024-04-12
Payer: MEDICARE

## 2024-04-12 VITALS
BODY MASS INDEX: 25.99 KG/M2 | HEART RATE: 75 BPM | OXYGEN SATURATION: 95 % | DIASTOLIC BLOOD PRESSURE: 80 MMHG | WEIGHT: 152.2 LBS | HEIGHT: 64 IN | SYSTOLIC BLOOD PRESSURE: 136 MMHG

## 2024-04-12 DIAGNOSIS — R03.0 ELEVATED BLOOD PRESSURE READING: ICD-10-CM

## 2024-04-12 PROCEDURE — 1123F ACP DISCUSS/DSCN MKR DOCD: CPT | Performed by: INTERNAL MEDICINE

## 2024-04-12 PROCEDURE — 1158F ADVNC CARE PLAN TLK DOCD: CPT | Performed by: INTERNAL MEDICINE

## 2024-04-12 PROCEDURE — 99214 OFFICE O/P EST MOD 30 MIN: CPT | Performed by: INTERNAL MEDICINE

## 2024-04-12 PROCEDURE — 1160F RVW MEDS BY RX/DR IN RCRD: CPT | Performed by: INTERNAL MEDICINE

## 2024-04-12 PROCEDURE — 1159F MED LIST DOCD IN RCRD: CPT | Performed by: INTERNAL MEDICINE

## 2024-04-12 NOTE — PROGRESS NOTES
"Subjective   Patient ID: [id number]     Marti Guzman is a 83 y.o. female who presents for Hypertension (Patient states that she has been checking her blood pressure at home and her blood pressure seems to be all over the place. Patint denies SOB, headaches, chest pains and dizziness. ).    Current Outpatient Medications:     calcium carbonate-vitamin D3 (Calcium 600 + D,3,) 600 mg-10 mcg (400 unit) tablet, Take 1 tablet by mouth once daily., Disp: 30 tablet, Rfl: 11    NAPROXEN ORAL, Take by mouth., Disp: , Rfl:   HPI  83-year-old patient presented to the office today for a follow-up visit.  Patient is known known to have hypertension her blood pressure at times was elevated and she was concerned we had her come back in the past with her home readings that were acceptable on her office reading was acceptable.  She seems to be doing a lot better and right now regarding her bursitis she received an injection and that helped her tremendously and she will continue with her follow-up with orthopedics.  She has no other complaints today.  Review of system was reviewed all normal except what is noted in HPI   Past Medical History:   Diagnosis Date    Other conditions influencing health status     Menopause    Other conditions influencing health status     Urinary Tract Infection    Other conditions influencing health status     Screening for malignant neoplasms, colon    Personal history of other diseases of the musculoskeletal system and connective tissue 10/25/2021    History of osteopenia    Personal history of other endocrine, nutritional and metabolic disease     History of hyperlipidemia    Personal history of urinary (tract) infections 07/01/2013    Personal history of urinary tract infection      Objective   /80 (BP Location: Left arm, Patient Position: Sitting)   Pulse 75   Ht 1.626 m (5' 4\")   Wt 69 kg (152 lb 3.2 oz)   SpO2 95%   BMI 26.13 kg/m²      Physical Exam  Constitutional:       Appearance: " Normal appearance.   Cardiovascular:      Rate and Rhythm: Normal rate and regular rhythm.      Pulses: Normal pulses.      Heart sounds: Normal heart sounds.   Pulmonary:      Effort: Pulmonary effort is normal.      Breath sounds: Normal breath sounds.   Neurological:      Mental Status: She is alert.         Assessment/Plan   Problem List Items Addressed This Visit    None  Visit Diagnoses       Elevated blood pressure reading              83-year-old patient with the following issues.    1.  Concerns regarding blood pressure the patient's blood pressure today is adequate no reason to be worried or start medications at this point we will keep an eye on it in the future.    2.  Hip bursitis status post significant improvement after injection patient is doing a lot better from that standpoint.    No other active issues or concerns I will see the patient back in the office in October for her annual exam and Medicare wellness visit.  Ann Griffith MD

## 2024-05-13 ENCOUNTER — OFFICE VISIT (OUTPATIENT)
Dept: ORTHOPEDIC SURGERY | Facility: CLINIC | Age: 84
End: 2024-05-13
Payer: MEDICARE

## 2024-05-13 DIAGNOSIS — M70.62 GREATER TROCHANTERIC BURSITIS, LEFT: Primary | ICD-10-CM

## 2024-05-13 PROCEDURE — 1123F ACP DISCUSS/DSCN MKR DOCD: CPT | Performed by: FAMILY MEDICINE

## 2024-05-13 PROCEDURE — 99213 OFFICE O/P EST LOW 20 MIN: CPT | Performed by: FAMILY MEDICINE

## 2024-05-13 PROCEDURE — 1160F RVW MEDS BY RX/DR IN RCRD: CPT | Performed by: FAMILY MEDICINE

## 2024-05-13 PROCEDURE — 1159F MED LIST DOCD IN RCRD: CPT | Performed by: FAMILY MEDICINE

## 2024-05-13 RX ORDER — METHYLPREDNISOLONE 4 MG/1
TABLET ORAL
Qty: 21 TABLET | Refills: 0 | Status: SHIPPED | OUTPATIENT
Start: 2024-05-13 | End: 2024-05-20

## 2024-05-13 RX ORDER — NAPROXEN 500 MG/1
500 TABLET ORAL 2 TIMES DAILY
Qty: 60 TABLET | Refills: 0 | Status: SHIPPED | OUTPATIENT
Start: 2024-05-13 | End: 2024-06-12

## 2024-05-13 NOTE — PROGRESS NOTES
Established Patient Follow-Up Visit    CC:   Chief Complaint   Patient presents with    Left Hip - Follow-up     Enthesopathy of left hip region         Trochanteric bursitis  S/p usg cor inj 2/12/24       HPI:  Marti is a 83 y.o. female returns here today for follow-up visit regarding: Left lateral hip pain greater trochanter bursitis.  She status post injection about 3 months ago.  She states that there is limited symptomatic relief.  She states more recently she developed that the hip started to improve a little bit is certainly not worse is slowly improving.  She has noted some worsening pain and discomfort going down the lateral side of the leg into her left knee.  She does not recall any obvious knee injury or trauma.  She denies any numbness tingling or burning.  Denies any back pain.          REVIEW OF SYSTEMS:  GENERAL: Negative for malaise, significant weight loss, fever  MUSCULOSKELETAL: See HPI  NEURO: Negative for numbness / tingling       PHYSICAL EXAM:  -Neuro: Gross sensation intact to the lower extremities bilaterally.  -Extremity: Left lower extremity exam: Left hip with minimal tenderness palpation of the greater trochanter bursa.  Mild soft tissue tenderness posteriorly at the insertion of the glutes max and medius.  Minimal tenderness over the piriformis.  No significant left or right SI pain.  She has no pain or discomfort at the insertion of the hamstring or at the ischial tuberosity.  Negative logroll.  Mild soft tissue discomfort along the course of the IT band.  Mild lateral sided knee pain no medial sided knee pain.  She has full extension flexion of the knee calf is soft nontender no laxity with valgus varus stress.  Negative Dana and Apley test.    IMAGING: No new imaging today      PROCEDURE: None  Procedures     ASSESSMENT:   Follow-up visit for:  Problem List Items Addressed This Visit    None  Visit Diagnoses       Greater trochanteric bursitis, left    -  Primary    Relevant  Medications    methylPREDNISolone (Medrol Dospak) 4 mg tablets    naproxen (Naprosyn) 500 mg tablet             PLAN: At this time we will hold off on any repeat x-rays for now.  Will have patient continue with home exercises to the hip and the knee she did complete physical therapy for the hip.  Patient was provided with an oral steroid and anti-inflammatory.  If the knee worsens or persist we can consider screening x-rays of the knee at follow-up can also consider injection versus further advanced imaging if there is any persistent worsening pain and/or development of mechanical symptoms.  We will hold off on any repeat injection to the hip as well today.  If necessary we can consider further evaluation with an MRI to determine the true extent/etiology of her discomfort.  Patient was noted to have more lateral sided knee pain which correlates with more of an IT band type issue.  She has no deep inguinal groin pain she has no medial sided knee pain.  She was given home exercises for the knee she will continue with her exercises at home for her hip.  Orders Placed This Encounter    methylPREDNISolone (Medrol Dospak) 4 mg tablets    naproxen (Naprosyn) 500 mg tablet           At the conclusion of the visit there were no further questions by the patient/family regarding their plan of care.  Patient was instructed to call or return with any issues, questions, or concerns regarding their injury and/or treatment plan described above.     05/14/24 at 11:58 AM - Cole C Budinsky, MD    Office: [phone]    This note was prepared using voice recognition software.  The details of this note are correct and have been reviewed, and corrected to the best of my ability.  Some grammatical errors may persist related to the Dragon software.

## 2024-06-24 ENCOUNTER — APPOINTMENT (OUTPATIENT)
Dept: ORTHOPEDIC SURGERY | Facility: CLINIC | Age: 84
End: 2024-06-24
Payer: MEDICARE

## 2024-07-08 ENCOUNTER — OFFICE VISIT (OUTPATIENT)
Dept: ORTHOPEDIC SURGERY | Facility: CLINIC | Age: 84
End: 2024-07-08
Payer: MEDICARE

## 2024-07-08 DIAGNOSIS — M70.62 GREATER TROCHANTERIC BURSITIS, LEFT: Primary | ICD-10-CM

## 2024-07-08 DIAGNOSIS — M76.892 ENTHESOPATHY OF LEFT HIP REGION: ICD-10-CM

## 2024-07-08 PROCEDURE — 1036F TOBACCO NON-USER: CPT | Performed by: FAMILY MEDICINE

## 2024-07-08 PROCEDURE — 99213 OFFICE O/P EST LOW 20 MIN: CPT | Performed by: FAMILY MEDICINE

## 2024-07-08 PROCEDURE — 1123F ACP DISCUSS/DSCN MKR DOCD: CPT | Performed by: FAMILY MEDICINE

## 2024-07-08 PROCEDURE — 1159F MED LIST DOCD IN RCRD: CPT | Performed by: FAMILY MEDICINE

## 2024-07-08 PROCEDURE — 1160F RVW MEDS BY RX/DR IN RCRD: CPT | Performed by: FAMILY MEDICINE

## 2024-07-08 NOTE — PROGRESS NOTES
Established Patient Follow-Up Visit    CC:   Chief Complaint   Patient presents with    Left Hip - Follow-up     Greater trochanteric bursitis       HPI:  Marti is a 83 y.o. female returns here today for follow-up visit regarding: Follow-up left hip pain greater trochanter bursitis.  She states she is doing well not having any pain or discomfort.  She is been compliant with her exercises.  She denies any additional needs or concerns.          REVIEW OF SYSTEMS:  GENERAL: Negative for malaise, significant weight loss, fever  MUSCULOSKELETAL: See HPI  NEURO: Negative for numbness / tingling       PHYSICAL EXAM:  -Neuro: Gross sensation intact to the lower extremities bilaterally.  -Extremity: Left hip demonstrates good range of motion no tenderness to palpation normal ambulation and gait.    IMAGING: No new imaging today  XR hip left with pelvis when performed 2 or 3 views  Narrative: Interpreted By:  Jud Horvath,   STUDY:  Left hip, two views.      INDICATION:  Signs/Symptoms:left hip pain x 3 months.      COMPARISON:  None.      ACCESSION NUMBER(S):  [id number]      ORDERING CLINICIAN:  LUZ JOSHI      FINDINGS:  No acute fracture or malalignment.  Left hip joint space is well maintained. Chronic enthesopathy changes  are visualized of the left greater trochanter. Soft tissues are  within normal limits.      Impression: 1. Chronic enthesopathy changes of the left greater trochanter.  Otherwise, unremarkable left hip radiographs.      MACRO:  None.      Signed by: Jud Horvath 1/6/2024 10:03 AM  Dictation workstation:   [id number]      PROCEDURE: None  Procedures     ASSESSMENT:   Follow-up visit for:  Problem List Items Addressed This Visit    None  Visit Diagnoses       Greater trochanteric bursitis, left    -  Primary    Enthesopathy of left hip region                 PLAN: At this time conservative approach management as she is doing fantastic.  Recommend she continue with her home exercises 1-2  times a week.  Should she have any acute worsening issues or concerns she should call and we can consider repeat oral steroid pack for her as long as it has been greater than 6 weeks from her most recent steroid pack.  Should she have persistent pain despite I called and refill of the steroid she will then need to come in for further evaluation and consider potential injection.  If she has any additional issues or concerns she may call them happy to reach out to her over the phone before having her come in for injection.  Again we can refill a one-time oral steroid pack ordered should she call with the return of her bursa type pain.  No orders of the defined types were placed in this encounter.          At the conclusion of the visit there were no further questions by the patient/family regarding their plan of care.  Patient was instructed to call or return with any issues, questions, or concerns regarding their injury and/or treatment plan described above.     07/08/24 at 1:52 PM - Cole C Budinsky, MD    Office: [phone]    This note was prepared using voice recognition software.  The details of this note are correct and have been reviewed, and corrected to the best of my ability.  Some grammatical errors may persist related to the Dragon software.

## 2024-08-27 DIAGNOSIS — M85.80 OSTEOPENIA, UNSPECIFIED LOCATION: ICD-10-CM

## 2024-08-27 RX ORDER — MULTIVITAMIN
1 TABLET ORAL DAILY
Qty: 90 TABLET | Refills: 3 | Status: SHIPPED | OUTPATIENT
Start: 2024-08-27

## 2024-10-08 ENCOUNTER — APPOINTMENT (OUTPATIENT)
Dept: PRIMARY CARE | Facility: CLINIC | Age: 84
End: 2024-10-08
Payer: MEDICARE

## 2024-10-22 ENCOUNTER — APPOINTMENT (OUTPATIENT)
Dept: PRIMARY CARE | Facility: CLINIC | Age: 84
End: 2024-10-22
Payer: MEDICARE

## 2025-02-14 ENCOUNTER — APPOINTMENT (OUTPATIENT)
Dept: PRIMARY CARE | Facility: CLINIC | Age: 85
End: 2025-02-14
Payer: MEDICARE

## 2025-02-14 VITALS
HEART RATE: 74 BPM | DIASTOLIC BLOOD PRESSURE: 74 MMHG | OXYGEN SATURATION: 98 % | RESPIRATION RATE: 16 BRPM | SYSTOLIC BLOOD PRESSURE: 122 MMHG | WEIGHT: 146.4 LBS | HEIGHT: 64 IN | TEMPERATURE: 97.4 F | BODY MASS INDEX: 25 KG/M2

## 2025-02-14 DIAGNOSIS — Z00.00 HEALTH CARE MAINTENANCE: Primary | ICD-10-CM

## 2025-02-14 DIAGNOSIS — Z00.00 ENCOUNTER FOR MEDICARE ANNUAL WELLNESS EXAM: ICD-10-CM

## 2025-02-14 DIAGNOSIS — Z12.31 ENCOUNTER FOR SCREENING MAMMOGRAM FOR MALIGNANT NEOPLASM OF BREAST: ICD-10-CM

## 2025-02-14 PROCEDURE — 1157F ADVNC CARE PLAN IN RCRD: CPT | Performed by: INTERNAL MEDICINE

## 2025-02-14 PROCEDURE — 1170F FXNL STATUS ASSESSED: CPT | Performed by: INTERNAL MEDICINE

## 2025-02-14 PROCEDURE — G0439 PPPS, SUBSEQ VISIT: HCPCS | Performed by: INTERNAL MEDICINE

## 2025-02-14 PROCEDURE — G0444 DEPRESSION SCREEN ANNUAL: HCPCS | Performed by: INTERNAL MEDICINE

## 2025-02-14 PROCEDURE — 1158F ADVNC CARE PLAN TLK DOCD: CPT | Performed by: INTERNAL MEDICINE

## 2025-02-14 PROCEDURE — 99214 OFFICE O/P EST MOD 30 MIN: CPT | Performed by: INTERNAL MEDICINE

## 2025-02-14 PROCEDURE — 1036F TOBACCO NON-USER: CPT | Performed by: INTERNAL MEDICINE

## 2025-02-14 PROCEDURE — 1123F ACP DISCUSS/DSCN MKR DOCD: CPT | Performed by: INTERNAL MEDICINE

## 2025-02-14 PROCEDURE — 1159F MED LIST DOCD IN RCRD: CPT | Performed by: INTERNAL MEDICINE

## 2025-02-14 PROCEDURE — 99397 PER PM REEVAL EST PAT 65+ YR: CPT | Performed by: INTERNAL MEDICINE

## 2025-02-14 ASSESSMENT — ACTIVITIES OF DAILY LIVING (ADL)
MANAGING_FINANCES: INDEPENDENT
DRESSING: INDEPENDENT
GROCERY_SHOPPING: INDEPENDENT
BATHING: INDEPENDENT
DOING_HOUSEWORK: INDEPENDENT
TAKING_MEDICATION: INDEPENDENT

## 2025-02-14 NOTE — PROGRESS NOTES
"Annual Medicare Wellness Exam/Comprehensive Problem Focused Follow Up  HPI/CC  Chief Complaint   Patient presents with    Medicare Annual Wellness Visit Subsequent    Annual Exam     Reviewed chart for care received since last appointment.  84-year-old patient presented to the office today for her annual exam and Medicare wellness visit, patient is doing great has no specific complaint or concern she denies any chest pain shortness of breath not even on exertion she denies abdominal pain nausea vomiting changes in the bowel movements or blood in the stool, she denies urine symptoms, she remains active, occasionally she has some incontinence and we did discuss how to proceed with Kegel exercises and empty the bladder frequently and avoid caffeine to help with that.    Assessment and Plan:  Problem List Items Addressed This Visit    None  Visit Diagnoses       Encounter for Medicare annual wellness exam              84-year-old patient with the following issues.    1.  No chronic medical illnesses or concerns.    2.  Healthcare maintenance issues lab work is requested mammogram is requested patient is up-to-date on her preventative care.  Bone density was updated.    Medicare wellness was completed with its requirements.  Patient was screened for depression and alcohol consumption.    I will see the patient back in the office in 1 year for repeat physical and sooner if needed.  ROS otherwise negative aside from what was mentioned above in HPI.    Vitals  /74   Pulse 74   Temp 36.3 °C (97.4 °F)   Resp 16   Ht 1.626 m (5' 4\")   Wt 66.4 kg (146 lb 6.4 oz)   SpO2 98%   BMI 25.13 kg/m²   Body mass index is 25.13 kg/m².  Physical Exam  Gen: Alert, NAD  HEENT:  Unremarkable  Neck:  No OPAL  Respiratory:  Lungs CTAB  Cardiovascular:  Heart RRR  Neuro:  Gross motor and sensory intact  Skin:  No suspicious lesions present  Breast: No masses, or axillary lymphadenopathy      Patient Care Team:  Ann Griffith MD " as PCP - General (Internal Medicine)  Ann Griffith MD as PCP - [place] [employer] PCP       Health Risk Assessment:  Patient was asked if he/she has any difficulty performing the following activities of daily living:  Preparing nutritious food and eating? No  Grocery shopping? No  Bathing and grooming yourself? No  Getting dressed? No  Using the toilet?No  Using the phone? No  Moving around from place to place (physical ambulation)? No  Doing housework (including laundry) independently? No  Managing finances independently? No  Managing medications independently? No  Doing housework (including laundry) independently? No    Patient was asked if he/she:  Feels safe in current home environment?: Yes  Uses seatbelt? Yes  Sees the dentist regularly? Yes  Exercises regularly: Yes  Suffers from depression, stress, anger, loneliness or social isolation, pain, suicidality? No    Dietary issues discussed: Yes  Cognitive Impairment No  Hearing difficulties: No  Visual Acuity assessed: Yes    What is your self-assessment of overall health status and life satisfaction? Good     5 minutes spent on SDOH screening:   Specifically Housing, Food Insecurity, Utilities and Transportatin Needs were evaluated   (See Screenings in Rooming section for documentation)  Food Insecurity: Not on file     Housing Stability: Not on file     Transportation Needs: Not on file         Allergies and Medications  Penicillins  Current Outpatient Medications   Medication Instructions    calcium carbonate-vitamin D3 600 mg-10 mcg (400 unit) tablet 1 tablet, oral, Daily    NAPROXEN ORAL Take by mouth.       Medications and Supplements  prescribed by me and other practitioners or clinical pharmacist (such as prescriptions, OTC's, herbal therapies and supplements) were reviewed and documented in the medical record.      Active Problem List  Patient Active Problem List   Diagnosis    Acne    Dyslipidemia    Dysuria    Fatigue    Abnormal urine  findings    Hematuria    Hypercalcemia    Hip pain    Knee pain    Laceration of scalp    Osteopenia    Pes anserine bursitis    Rosacea    Facial contusion, initial encounter    Traumatic ecchymosis of face    Vitamin D deficiency    Weight gain    Body mass index (BMI) of 23.0 to 23.9 in adult    History of recent fall    Closed fracture of upper end of tibia    Pain of left hip       Comprehensive Medical/Surgical/Social/Family History  Past Medical History:   Diagnosis Date    Other conditions influencing health status     Menopause    Other conditions influencing health status     Urinary Tract Infection    Other conditions influencing health status     Screening for malignant neoplasms, colon    Personal history of other diseases of the musculoskeletal system and connective tissue 10/25/2021    History of osteopenia    Personal history of other endocrine, nutritional and metabolic disease     History of hyperlipidemia    Personal history of urinary (tract) infections 07/01/2013    Personal history of urinary tract infection     Past Surgical History:   Procedure Laterality Date    COLONOSCOPY  09/15/2017    Complete Colonoscopy    CORONARY ARTERY BYPASS GRAFT      KNEE SURGERY  12/17/2012    Knee Surgery Right    OTHER SURGICAL HISTORY  12/17/2012    Facial Dermabrasion    TONSILLECTOMY  12/17/2012    Tonsillectomy With Adenoidectomy     Social History     Social History Narrative    Not on file         Tobacco/Alcohol/Opioid use, as well as Illicit Drug Use was screened for/reviewed and documented in Social Documentation section of the chart and medication list as appropriate     Depression Screening  Depression screening completed using the PHQ-2 questions, with results documented in the chart/encounter (5 min spent on this).  (See Rooming Screening section for documentation, and/or progress note for additional information)    Cardiac Risk Assessment (15 minutes spent on this)  The ASCVD Risk score ([place]abel  CAN, et al., 2019) failed to calculate for the following reasons:    The 2019 ASCVD risk score is only valid for ages 40 to 79    Cardiovascular risk was discussed and, if needed, lifestyle modifications recommended, including nutritional choices, exercise, and elimination of habits contributing to risk. We agreed on a plan to reduce the current cardiovascular risk based on above discussion as needed.     Aspirin use/disuse was discussed and documented in the Problem List of the medical record (under Cardiac Risk Counseling) after reviewing the updated guidelines below:  Consider low dose Aspirin ( mg) use if the benefit for cardiovascular disease prevention outweighs risk for bleeding complications.   Discussed that in general, low dose ASA should be considered:  In patients WITHOUT prior MI/stroke/PAD (primary prevention):   a. Age <60: Use if 10-year cardiovascular disease risk >20%, with discussion of risks and benefits with patient  b. Age 60-<70: Use if 10-year cardiovascular disease risk >20% and low bleeding (e.g., gastrointestinal) risk, with discussion of risks and benefits with patient  c. Age >=70: Do not use    In patients WITH prior MI/stroke/PAD (secondary prevention):   Generally use unless extremely high bleeding (e.g., gastrointenstinal) risk, with discussion of risks and benefits with patient    Advance Directives Discussion  Advanced Care Planning (including a Living Will, Healthcare POA, as well as specific end of life choices and/or directives), was discussed with the patient and/or surrogate, voluntarily, and details of that discussion documented in the Problem List (under Advanced Directives Discussion) of the medical record.   (16 min spent discussing above)     During the course of the visit the patient was educated and counseled about age appropriate screening and preventive services.   Completed preventive screenings were documented in the chart (see Routine Health Maintenance in  Problem List) and orders were placed for outstanding screenings/procedures as documented in the Assessment and Plan.    Patient Instructions (the written plan) was given to the patient at check out as part of the AVS.

## 2025-02-18 LAB
ALBUMIN SERPL-MCNC: 4.1 G/DL (ref 3.6–5.1)
ALP SERPL-CCNC: 70 U/L (ref 37–153)
ALT SERPL-CCNC: 14 U/L (ref 6–29)
ANION GAP SERPL CALCULATED.4IONS-SCNC: 7 MMOL/L (CALC) (ref 7–17)
AST SERPL-CCNC: 18 U/L (ref 10–35)
BILIRUB SERPL-MCNC: 0.6 MG/DL (ref 0.2–1.2)
BUN SERPL-MCNC: 12 MG/DL (ref 7–25)
CALCIUM SERPL-MCNC: 9.1 MG/DL (ref 8.6–10.4)
CHLORIDE SERPL-SCNC: 104 MMOL/L (ref 98–110)
CHOLEST SERPL-MCNC: 229 MG/DL
CHOLEST/HDLC SERPL: 4.2 (CALC)
CO2 SERPL-SCNC: 29 MMOL/L (ref 20–32)
CREAT SERPL-MCNC: 0.77 MG/DL (ref 0.6–0.95)
EGFRCR SERPLBLD CKD-EPI 2021: 77 ML/MIN/1.73M2
GLUCOSE SERPL-MCNC: 83 MG/DL (ref 65–99)
HDLC SERPL-MCNC: 55 MG/DL
LDLC SERPL CALC-MCNC: 153 MG/DL (CALC)
NONHDLC SERPL-MCNC: 174 MG/DL (CALC)
POTASSIUM SERPL-SCNC: 4.2 MMOL/L (ref 3.5–5.3)
PROT SERPL-MCNC: 6.5 G/DL (ref 6.1–8.1)
SODIUM SERPL-SCNC: 140 MMOL/L (ref 135–146)
T4 FREE SERPL-MCNC: 1.1 NG/DL (ref 0.8–1.8)
TRIGL SERPL-MCNC: 97 MG/DL
TSH SERPL-ACNC: 4.91 MIU/L (ref 0.4–4.5)

## 2025-03-03 ENCOUNTER — HOSPITAL ENCOUNTER (OUTPATIENT)
Dept: RADIOLOGY | Facility: CLINIC | Age: 85
Discharge: HOME | End: 2025-03-03
Payer: MEDICARE

## 2025-03-03 VITALS — WEIGHT: 146 LBS | BODY MASS INDEX: 24.92 KG/M2 | HEIGHT: 64 IN

## 2025-03-03 DIAGNOSIS — Z12.31 ENCOUNTER FOR SCREENING MAMMOGRAM FOR MALIGNANT NEOPLASM OF BREAST: ICD-10-CM

## 2025-03-03 PROCEDURE — 77067 SCR MAMMO BI INCL CAD: CPT

## 2025-03-03 PROCEDURE — 77063 BREAST TOMOSYNTHESIS BI: CPT | Performed by: STUDENT IN AN ORGANIZED HEALTH CARE EDUCATION/TRAINING PROGRAM

## 2025-03-03 PROCEDURE — 77067 SCR MAMMO BI INCL CAD: CPT | Performed by: STUDENT IN AN ORGANIZED HEALTH CARE EDUCATION/TRAINING PROGRAM

## 2025-06-07 ENCOUNTER — OFFICE VISIT (OUTPATIENT)
Dept: URGENT CARE | Age: 85
End: 2025-06-07
Payer: MEDICARE

## 2025-06-07 VITALS
HEART RATE: 83 BPM | BODY MASS INDEX: 25.06 KG/M2 | SYSTOLIC BLOOD PRESSURE: 143 MMHG | DIASTOLIC BLOOD PRESSURE: 73 MMHG | TEMPERATURE: 98.7 F | OXYGEN SATURATION: 98 % | RESPIRATION RATE: 16 BRPM | WEIGHT: 146 LBS

## 2025-06-07 DIAGNOSIS — B34.9 ACUTE VIRAL SYNDROME: ICD-10-CM

## 2025-06-07 DIAGNOSIS — H10.32 ACUTE BACTERIAL CONJUNCTIVITIS OF LEFT EYE: ICD-10-CM

## 2025-06-07 DIAGNOSIS — R05.1 ACUTE COUGH: ICD-10-CM

## 2025-06-07 DIAGNOSIS — L03.213 PRESEPTAL CELLULITIS OF LEFT EYE: Primary | ICD-10-CM

## 2025-06-07 RX ORDER — POLYMYXIN B SULFATE AND TRIMETHOPRIM 1; 10000 MG/ML; [USP'U]/ML
1 SOLUTION OPHTHALMIC EVERY 4 HOURS
Qty: 10 ML | Refills: 0 | Status: SHIPPED | OUTPATIENT
Start: 2025-06-07 | End: 2025-06-17

## 2025-06-07 RX ORDER — BENZONATATE 200 MG/1
200 CAPSULE ORAL 3 TIMES DAILY PRN
Qty: 21 CAPSULE | Refills: 0 | Status: SHIPPED | OUTPATIENT
Start: 2025-06-07 | End: 2025-06-14

## 2025-06-07 RX ORDER — DOXYCYCLINE HYCLATE 100 MG
100 TABLET ORAL 2 TIMES DAILY
Qty: 14 TABLET | Refills: 0 | Status: SHIPPED | OUTPATIENT
Start: 2025-06-07 | End: 2025-06-14

## 2025-06-07 ASSESSMENT — VISUAL ACUITY: OU: 1

## 2025-06-07 NOTE — PATIENT INSTRUCTIONS
Please take the antibiotic as prescribed.   When taking doxycycline, please remain upright for 1/2 an hour after you take it.   Wear sun protection because it can make you burn more easily.   If the redness or swelling around your eye worsens, or if you develop pain, fevers, chills, or vision changes, please go to the emergency room.

## 2025-06-07 NOTE — PROGRESS NOTES
Subjective   Patient ID: Marti Guzman is a 84 y.o. female. They present today with a chief complaint of Eye Problem (Woke up with eye discharge and has had a cough X 10 days).    History of Present Illness  Subjective  Patient presents with 10 days of nonproductive cough after she returned from [place]. Over the past four to five days, she developed nasal congestion and a runny nose. One day ago, she began experiencing pruritus in her left eye and woke up this morning with her eye crusted shut. She also reports periorbital erythema and swelling. She denies fevers, chills, productive cough, wheezing, hemoptysis, or shortness of breath. She also denies sore throat, ear pain, sinus pain, and vision changes.     Review of Systems  Constitutional: Negative for fever, chills, anorexia, weight loss, malaise  Eye:  Denies photophobia, visual changes, orbital pain, diplopia   ENMT: Negative for ear pain, mouth pain, throat pain   Respiratory: Negative for hemoptysis, wheezing, shortness of breath   Cardiac: Negative for chest pain, dyspnea on exertion, orthopnea, palpitations, syncope   Gastrointestinal: Negative for nausea, vomiting, diarrhea, constipation, abdominal pain  Neurological: Negative for dizziness, confusion, headache, seizures, syncope, paresthesia, numbness, weakness.    Skin: Negative for mass, pain, ulcer   All other systems are negative            History provided by:  Patient   used: No    Eye Problem      Past Medical History  Allergies as of 06/07/2025 - Reviewed 06/07/2025   Allergen Reaction Noted    Penicillins Unknown 09/29/2023       Prescriptions Prior to Admission[1]     Medical History[2]    Surgical History[3]     reports that she has never smoked. She has never used smokeless tobacco. She reports current alcohol use. She reports that she does not use drugs.    Review of Systems  Review of Systems                               Objective    Vitals:    06/07/25 0845    BP: 143/73   Pulse: 83   Resp: 16   Temp: 37.1 °C (98.7 °F)   SpO2: 98%   Weight: 66.2 kg (146 lb)     No LMP recorded. Patient is postmenopausal.    Physical Exam  Vitals and nursing note reviewed.   Constitutional:       General: She is not in acute distress.     Appearance: Normal appearance. She is normal weight. She is ill-appearing. She is not toxic-appearing or diaphoretic.   HENT:      Head: Left periorbital erythema present. No right periorbital erythema.      Right Ear: Tympanic membrane, ear canal and external ear normal. There is no impacted cerumen. Tympanic membrane is not injected, perforated, erythematous or bulging.      Left Ear: Tympanic membrane, ear canal and external ear normal. There is no impacted cerumen. Tympanic membrane is not injected, perforated, erythematous or bulging.      Nose: Congestion and rhinorrhea present.      Right Turbinates: Enlarged and swollen.      Left Turbinates: Enlarged and swollen.      Mouth/Throat:      Mouth: Mucous membranes are moist.      Pharynx: Oropharynx is clear. Postnasal drip present. No pharyngeal swelling, oropharyngeal exudate, posterior oropharyngeal erythema or uvula swelling.      Tonsils: No tonsillar exudate or tonsillar abscesses.   Eyes:      General: Vision grossly intact. Gaze aligned appropriately. No scleral icterus.        Right eye: No discharge or hordeolum.         Left eye: Discharge present.No hordeolum.      Conjunctiva/sclera:      Right eye: Right conjunctiva is not injected. No chemosis, exudate or hemorrhage.     Left eye: Left conjunctiva is injected. Exudate present. No chemosis or hemorrhage.     Pupils: Pupils are equal, round, and reactive to light.      Comments: Circumferential non-tender erythema of the left orbit with purulent discharge and conjunctival injection.    Cardiovascular:      Rate and Rhythm: Normal rate and regular rhythm.      Pulses: Normal pulses.      Heart sounds: Normal heart sounds. No murmur  heard.     No friction rub. No gallop.   Pulmonary:      Effort: Pulmonary effort is normal. No respiratory distress.      Breath sounds: Normal breath sounds. No stridor. No wheezing, rhonchi or rales.   Musculoskeletal:      Cervical back: Normal range of motion and neck supple. No rigidity or tenderness.   Lymphadenopathy:      Cervical: No cervical adenopathy.   Skin:     General: Skin is warm and dry.      Coloration: Skin is not jaundiced or pale.      Findings: No bruising, erythema or rash.   Neurological:      General: No focal deficit present.      Mental Status: She is alert and oriented to person, place, and time.      Sensory: No sensory deficit.         Procedures    Point of Care Test & Imaging Results from this visit  No results found for this visit on 06/07/25.   Imaging  No results found.    Cardiology, Vascular, and Other Imaging  No other imaging results found for the past 2 days      Diagnostic study results (if any) were reviewed by MEAGAN Tong.    Assessment/Plan   Allergies, medications, history, and pertinent labs/EKGs/Imaging reviewed by MEAGAN Tong.     Medical Decision Making    On exam, the patient's vital signs were stable, and she is not febrile, tachycardic, hypotensive or hypoxic. She is in no acute distress. Her lung  were clear. Vision was grossly intact, and pupils were equal, round and reactive to light. She had conjunctival injection and tearing with periorbital erythema but no tenderness. Her physical exam findings are consistent with viral syndrome as well as bacterial conjunctivitis and preseptal cellulitis. There is no evidence of strep, otitis media, pneumonia or sepsis. We discussed symptomatic and supportive care, and I prescribed doxycycline and Polytrim drops. I advised her to follow up with her PCP and we discussed when she should seek care in the emergency room. She had no questions prior to her discharge.     Orders and Diagnoses  There are  no diagnoses linked to this encounter.    Medical Admin Record      Patient disposition: Home    Electronically signed by MEAGAN Tong  8:49 AM           [1] (Not in a hospital admission)  [2]   Past Medical History:  Diagnosis Date    Other conditions influencing health status     Menopause    Other conditions influencing health status     Urinary Tract Infection    Other conditions influencing health status     Screening for malignant neoplasms, colon    Personal history of other diseases of the musculoskeletal system and connective tissue 10/25/2021    History of osteopenia    Personal history of other endocrine, nutritional and metabolic disease     History of hyperlipidemia    Personal history of urinary (tract) infections 07/01/2013    Personal history of urinary tract infection   [3]   Past Surgical History:  Procedure Laterality Date    COLONOSCOPY  09/15/2017    Complete Colonoscopy    CORONARY ARTERY BYPASS GRAFT      KNEE SURGERY  12/17/2012    Knee Surgery Right    OTHER SURGICAL HISTORY  12/17/2012    Facial Dermabrasion    TONSILLECTOMY  12/17/2012    Tonsillectomy With Adenoidectomy

## 2026-02-10 ENCOUNTER — APPOINTMENT (OUTPATIENT)
Dept: PRIMARY CARE | Facility: CLINIC | Age: 86
End: 2026-02-10
Payer: MEDICARE